# Patient Record
Sex: MALE | URBAN - METROPOLITAN AREA
[De-identification: names, ages, dates, MRNs, and addresses within clinical notes are randomized per-mention and may not be internally consistent; named-entity substitution may affect disease eponyms.]

---

## 2025-01-13 ENCOUNTER — OFFICE VISIT (OUTPATIENT)
Dept: URGENT CARE | Age: 60
End: 2025-01-13
Payer: COMMERCIAL

## 2025-01-13 VITALS
BODY MASS INDEX: 37.3 KG/M2 | OXYGEN SATURATION: 96 % | DIASTOLIC BLOOD PRESSURE: 76 MMHG | SYSTOLIC BLOOD PRESSURE: 122 MMHG | HEART RATE: 51 BPM | WEIGHT: 275 LBS | TEMPERATURE: 97.7 F | RESPIRATION RATE: 20 BRPM

## 2025-01-13 DIAGNOSIS — J01.90 ACUTE SINUSITIS, RECURRENCE NOT SPECIFIED, UNSPECIFIED LOCATION: Primary | ICD-10-CM

## 2025-01-13 PROCEDURE — 99203 OFFICE O/P NEW LOW 30 MIN: CPT | Performed by: PHYSICIAN ASSISTANT

## 2025-01-13 RX ORDER — AMOXICILLIN AND CLAVULANATE POTASSIUM 875; 125 MG/1; MG/1
1 TABLET, FILM COATED ORAL 2 TIMES DAILY
Qty: 14 TABLET | Refills: 0 | Status: SHIPPED | OUTPATIENT
Start: 2025-01-13 | End: 2025-01-20

## 2025-01-13 RX ORDER — ROSUVASTATIN CALCIUM 5 MG/1
5 TABLET, COATED ORAL DAILY
COMMUNITY

## 2025-01-13 RX ORDER — LOSARTAN POTASSIUM 50 MG/1
50 TABLET ORAL DAILY
COMMUNITY

## 2025-01-13 NOTE — PROGRESS NOTES
Subjective   Patient ID: Sacha Blount is a 59 y.o. male. They present today with a chief complaint of Sinus Problem and Earache.    History of Present Illness  HPI  1.5 week h/o sinus congestion and facial pressure. Clear drainage. Mild cough and L earache since Friday. No fever. Felt better then worse Friday.     Past Medical History  Allergies as of 01/13/2025    (No Known Allergies)       (Not in a hospital admission)             History reviewed. No pertinent past medical history.    History reviewed. No pertinent surgical history.         Review of Systems  Review of Systems   Review of systems: A complete review of systems was done, and is as stated in the history of present illness, is otherwise negative or not pertinent to the complaint.       Objective    Vitals:    01/13/25 1749   BP: 122/76   Pulse: 51   Resp: 20   Temp: 36.5 °C (97.7 °F)   SpO2: 96%   Weight: 125 kg (275 lb)     No LMP for male patient.    Physical Exam  NAD. Well appearing    MMM   PERRLA   no icterus   TMs clear bilat   Oropharynx clear of exudate or tonsillar swelling/exudate   No focal sinus ttp   neck supple. CATRINA. No LAD   no resp distress. Lungs CTAB without w/r/r   RRR. No m/r/g   Abd; Soft. NTTP   CHERY x 4. MS 5/5   Skin; warm without rashes   pulses 2+ throughout   neuro intact with normal sensation and motor   psych a&o x 3       Procedures    Point of Care Test & Imaging Results from this visit    Assessment/Plan   Allergies, medications, history, and pertinent labs/EKGs/Imaging reviewed by Dhaval Varma PA-C.     Medical Decision Making  -possible sinusitis given double worsening  -Augmentin course  -supportive care  Fu pcp 2-3 days if not improved   All ?s answered     Orders and Diagnoses  Diagnoses and all orders for this visit:  Acute sinusitis, recurrence not specified, unspecified location  -     amoxicillin-pot clavulanate (Augmentin) 875-125 mg tablet; Take 1 tablet by mouth 2 times a day for 7 days.

## 2025-06-18 ENCOUNTER — OFFICE VISIT (OUTPATIENT)
Dept: URGENT CARE | Age: 60
End: 2025-06-18
Payer: COMMERCIAL

## 2025-06-18 VITALS
BODY MASS INDEX: 40.01 KG/M2 | RESPIRATION RATE: 20 BRPM | SYSTOLIC BLOOD PRESSURE: 130 MMHG | WEIGHT: 295 LBS | OXYGEN SATURATION: 98 % | DIASTOLIC BLOOD PRESSURE: 82 MMHG | HEART RATE: 76 BPM | TEMPERATURE: 96.5 F

## 2025-06-18 DIAGNOSIS — L24.7 IRRITANT CONTACT DERMATITIS DUE TO PLANTS, EXCEPT FOOD: Primary | ICD-10-CM

## 2025-06-18 PROCEDURE — 99213 OFFICE O/P EST LOW 20 MIN: CPT | Performed by: NURSE PRACTITIONER

## 2025-06-18 RX ORDER — TRIAMCINOLONE ACETONIDE 1 MG/G
CREAM TOPICAL
Qty: 80 G | Refills: 0 | Status: SHIPPED | OUTPATIENT
Start: 2025-06-18

## 2025-06-18 RX ORDER — PREDNISONE 10 MG/1
TABLET ORAL
Qty: 35 TABLET | Refills: 0 | Status: SHIPPED | OUTPATIENT
Start: 2025-06-18

## 2025-06-18 NOTE — PROGRESS NOTES
Subjective   Patient ID: Sacha Blount is a 60 y.o. male. They present today with a chief complaint of Poison Ivy (Shoulder, arms, groin area, x3days ).    History of Present Illness    History provided by:  Patient   used: No    Poison Ivy  Location:  Itchy red rash started on the left knee then spread to the left groin then to his stomach, chest and then arms.  Quality:  Patient denies pain  Severity:  Moderate  Onset quality:  Sudden  Duration:  3 days  Context:  Patient's dog went to play behind some bushes and then the patient played with the dog. Patient thinks that the dog might have transferred the plant oil to him.  Patient denies pain, bleeding and discharge.  Relieved by:  Nothing  Ineffective treatments:  Nothing tried.      Review of Systems  Review of Systems    Objective    Vitals:    06/18/25 1822   BP: 130/82   Pulse: 76   Resp: 20   Temp: 35.8 °C (96.5 °F)   SpO2: 98%   Weight: 134 kg (295 lb)     No LMP for male patient.    Physical Exam  Skin:     General: Skin is warm.      Comments: Erythematous papules and macules noted on the chest, abdomen, upper and lower extremities. No bleeding, discharge or tenderness.          Procedures    Point of Care Test & Imaging Results from this visit  No results found for this visit on 06/18/25.   No results found.    Diagnostic study results (if any) were reviewed by GEOVANNY Pena.    Assessment/Plan   Allergies, medications, history, and pertinent labs/EKGs/Imaging reviewed by GEOVANNY Pena.     Medical Decision Making  -Avoid using or touching whatever might have caused your rash.  -Protect your skin from anything that might irritate it or cause an allergy. For example, wear gloves if you need to work with harsh soaps.  -Avoid scratching your skin. It might help to:  -Wear cotton gloves at night.  -Keep your nails short and clean.  -Cover the parts of your skin that itch.  F/u with PCP if you develop the following.  -You have  a rash that does not go away within 2 weeks.  -Your rash gets worse or spreads over large parts of your body.  -You have signs of infection like swelling, warmth, pain, or fever.  Pt verbalized understanding of the instructions.      Orders and Diagnoses  Diagnoses and all orders for this visit:  Irritant contact dermatitis due to plants, except food  -     predniSONE (Deltasone) 10 mg tablet; Take 4 tabs by mouth daily for 5 days, then take 2 tabs by mouth daily for 5 days, then take 1 tab by mouth daily for 5 days with food.  -     triamcinolone (Kenalog) 0.1 % cream; Apply to affected area 1-2 times daily as needed. Avoid face and groin.      Medical Admin Record      Patient disposition: Home      Electronically signed by GEOVANNY Pena  6:42 PM